# Patient Record
Sex: MALE
[De-identification: names, ages, dates, MRNs, and addresses within clinical notes are randomized per-mention and may not be internally consistent; named-entity substitution may affect disease eponyms.]

---

## 2022-05-17 ENCOUNTER — HOSPITAL ENCOUNTER (EMERGENCY)
Dept: HOSPITAL 5 - ED | Age: 37
Discharge: HOME | End: 2022-05-17
Payer: SELF-PAY

## 2022-05-17 DIAGNOSIS — R20.2: Primary | ICD-10-CM

## 2022-05-17 DIAGNOSIS — M54.2: ICD-10-CM

## 2022-05-17 LAB
APTT BLD: 28.1 SEC. (ref 24.2–36.6)
BASOPHILS # (AUTO): 0.1 K/MM3 (ref 0–0.1)
BASOPHILS NFR BLD AUTO: 1 % (ref 0–1.8)
BUN SERPL-MCNC: 10 MG/DL (ref 9–20)
BUN/CREAT SERPL: 13 %
CALCIUM SERPL-MCNC: 9.3 MG/DL (ref 8.4–10.2)
EOSINOPHIL # BLD AUTO: 0.4 K/MM3 (ref 0–0.4)
EOSINOPHIL NFR BLD AUTO: 4 % (ref 0–4.3)
HCT VFR BLD CALC: 51.8 % (ref 35.5–45.6)
HEMOLYSIS INDEX: 37
HGB BLD-MCNC: 17.4 GM/DL (ref 11.8–15.2)
INR PPP: 0.87 (ref 0.87–1.13)
LYMPHOCYTES # BLD AUTO: 2.5 K/MM3 (ref 1.2–5.4)
LYMPHOCYTES NFR BLD AUTO: 26.7 % (ref 13.4–35)
MCHC RBC AUTO-ENTMCNC: 34 % (ref 32–34)
MCV RBC AUTO: 93 FL (ref 84–94)
MONOCYTES # (AUTO): 0.8 K/MM3 (ref 0–0.8)
MONOCYTES % (AUTO): 8.3 % (ref 0–7.3)
PLATELET # BLD: 187 K/MM3 (ref 140–440)
RBC # BLD AUTO: 5.56 M/MM3 (ref 3.65–5.03)

## 2022-05-17 PROCEDURE — 36415 COLL VENOUS BLD VENIPUNCTURE: CPT

## 2022-05-17 PROCEDURE — 85730 THROMBOPLASTIN TIME PARTIAL: CPT

## 2022-05-17 PROCEDURE — 71046 X-RAY EXAM CHEST 2 VIEWS: CPT

## 2022-05-17 PROCEDURE — 85610 PROTHROMBIN TIME: CPT

## 2022-05-17 PROCEDURE — 80048 BASIC METABOLIC PNL TOTAL CA: CPT

## 2022-05-17 PROCEDURE — 85025 COMPLETE CBC W/AUTO DIFF WBC: CPT

## 2022-05-17 PROCEDURE — 93005 ELECTROCARDIOGRAM TRACING: CPT

## 2022-05-17 PROCEDURE — 99284 EMERGENCY DEPT VISIT MOD MDM: CPT

## 2022-05-17 PROCEDURE — 70450 CT HEAD/BRAIN W/O DYE: CPT

## 2022-05-17 PROCEDURE — 84484 ASSAY OF TROPONIN QUANT: CPT

## 2022-05-17 NOTE — XRAY REPORT
CHEST 2 VIEWS 



INDICATION: 

chest pain.



COMPARISON: 

None.



FINDINGS:

Support devices: None.



Heart: Within normal limits. 

Lungs/Pleura: No acute air space or interstitial disease.   No significant pleural effusion. 



IMPRESSION:  No acute findings.

 



Signer Name: Joe Chen MD 

Signed: 5/17/2022 8:46 AM

Workstation Name: [id number]

## 2022-05-17 NOTE — CAT SCAN REPORT
CT head/brain wo con



INDICATION / CLINICAL INFORMATION:

36 years Male; left sided numbness.



TECHNIQUE: Routine CT head without contrast. All CT scans at this location are performed using CT dos
e reduction for ALARA by means of automated exposure control.



COMPARISON: 

None.



FINDINGS:



BRAIN / INTRACRANIAL CONTENTS: No acute hemorrhage, mass effect, midline shift, hydrocephalus, or acu
te, large territorial infarct. No signs of significant atrophy or chronic infarct. No significant whi
te matter abnormality seen.



CRANIOCERVICAL JUNCTION: No significant abnormality.



ORBITS: No significant abnormality of visualized orbits.

SINUSES / MASTOIDS: Mild to moderate mucosal thickening in the ethmoids.



ADDITIONAL FINDINGS: Prominent soft tissue is seen in the roof of the nasopharynx, presumably related
 to reactive adenoidal tissue. Please clinically correlate. 



IMPRESSION:

1. No focal mass, hemorrhage, hydrocephalus, or acute, large territorial infarct. 



Signer Name: Chris Tsai MD, III 

Signed: 5/17/2022 8:55 AM

Workstation Name: [id number]

## 2022-05-17 NOTE — EMERGENCY DEPARTMENT REPORT
ED General Adult HPI





- General


Chief complaint: Neuro Symptoms/Deficit


Stated complaint: BODY PAIN


Time Seen by Provider: 05/17/22 07:43


Source: patient


Mode of arrival: Ambulatory


Limitations: No Limitations





- History of Present Illness


Initial comments: 





Patient is a 36 years old male with no significant past medical history.  

Patient presented to the ER complaining of 2-1/2 weeks history of on and off 

numbness to the left face left upper and lower extremity.  He denied any 

weakness.  He reported left arm pain also.  Neck pain.  Patient denied any 

recent injury.  He denied also headache, focal weakness, bowel or bladder 

incontinence.


Stroke scale is 0.





- Related Data


                                    Allergies











Allergy/AdvReac Type Severity Reaction Status Date / Time


 


No Known Allergies Allergy   Unverified 05/17/22 01:42














ED Review of Systems


ROS: 


Stated complaint: BODY PAIN


Other details as noted in HPI





Comment: All other systems reviewed and negative


Constitutional: denies: chills, fever


Respiratory: denies: cough, shortness of breath, SOB with exertion


Cardiovascular: denies: chest pain, palpitations


Gastrointestinal: denies: abdominal pain, nausea, vomiting, diarrhea, 

constipation, hematemesis, hematochezia


Musculoskeletal: denies: back pain


Neurological: numbness.  denies: headache, weakness, paresthesias, confusion, 

abnormal gait





ED Physical Exam





- General


Limitations: No Limitations


General appearance: alert, in no apparent distress





- Head


Head exam: Present: atraumatic, normocephalic, normal inspection





- Eye


Eye exam: Present: normal appearance





- ENT


ENT exam: Present: normal exam, normal orophraynx, mucous membranes moist





- Neck


Neck exam: Present: normal inspection, full ROM.  Absent: tenderness, 

meningismus





- Respiratory


Respiratory exam: Present: normal lung sounds bilaterally





- Cardiovascular


Cardiovascular Exam: Present: regular rate, normal rhythm, normal heart sounds





- GI/Abdominal


GI/Abdominal exam: Present: soft, normal bowel sounds.  Absent: distended, 

tenderness, guarding, rebound, rigid, organomegaly, mass, bruit, pulsatile mass,

hernia





- Extremities Exam


Extremities exam: Present: normal inspection, full ROM, normal capillary refill.

 Absent: tenderness, pedal edema, joint swelling, calf tenderness





- Back Exam


Back exam: Present: normal inspection, full ROM.  Absent: CVA tenderness (R), 

CVA tenderness (L)





- Neurological Exam


Neurological exam: Present: alert, oriented X3, CN II-XII intact, normal gait, 

reflexes normal.  Absent: abnormal gait, motor sensory deficit





- Psychiatric


Psychiatric exam: Present: normal mood





- Skin


Skin exam: Present: warm, intact, normal color





ED Medical Decision Making





- Lab Data


Result diagrams: 


                                 05/17/22 09:06





                                 05/17/22 09:06





- EKG Data


-: EKG Interpreted by Me


EKG shows normal: sinus rhythm


Rate: normal





- EKG Data


Interpretation: no acute changes





- Radiology Data


Radiology results: report reviewed





- Medical Decision Making





Patient is a 36 years old male with no significant past medical history.  Regi

ent presented to the ER complaining of 2-1/2 weeks history of on and off 

numbness to the left face left upper and lower extremity.  He denied any 

weakness.  He reported left arm pain also.  Neck pain.  Patient denied any 

recent injury.  He denied also headache, focal weakness, bowel or bladder incon

tinence.





Patient remained stable in the ER with a stable vital sign.  CT brain is 

negative for acute finding.  Labs reviewed and is unremarkable.  EKG is 

unremarkable.  Stroke scale is 0.  Patient advised to follow-up with his primary

care physician in the next 2 to 3 days and to return to the ER if he develop any

new symptoms.





Critical care attestation.: 


If time is entered above; I have spent that time in minutes in the direct care 

of this critically ill patient, excluding procedure time.








ED Disposition


Clinical Impression: 


 Numbness, Acute neck pain





Disposition: 01 HOME / SELF CARE / HOMELESS


Is pt being admited?: No


Condition: Stable


Instructions:  Radicular Pain, Paresthesia


Referrals: 


[facility] [Provider Group] - 3-5 Days


Print Language: Yakut

## 2022-05-17 NOTE — ELECTROCARDIOGRAPH REPORT
[facility]

                                       

Test Date:    2022               Test Time:    01:48:29

Pat Name:     GABRIEL BAUTISTA          Department:   

Patient ID:   [id number]          Room:          

Gender:       M                        Technician:   LULU

:          1985               Requested By: DONALD HURST

Order Number: [id number]              Reading MD:   Syd Martinez

                                 Measurements

Intervals                              Axis          

Rate:         78                       P:            27

FL:           150                      QRS:          39

QRSD:         99                       T:            57

QT:           369                                    

QTc:          421                                    

                           Interpretive Statements

Sinus rhythm

ST elev, probable normal early repol pattern

No previous ECG available for comparison

Electronically Signed On 2022 12:37:47 EDT by Syd Martinez

## 2022-05-17 NOTE — ELECTROCARDIOGRAPH REPORT
[facility]

                                       

Test Date:    2022               Test Time:    08:46:34

Pat Name:     GABRIEL BAUTISTA          Department:   

Patient ID:   [id number]          Room:          

Gender:       M                        Technician:   GAVIN

:          1985               Requested By: REYNALDO HOWARD

Order Number: [id number]              Reading MD:   Syd Martinez

                                 Measurements

Intervals                              Axis          

Rate:         78                       P:            27

ME:           157                      QRS:          24

QRSD:         101                      T:            4

QT:           368                                    

QTc:          420                                    

                           Interpretive Statements

Sinus rhythm

Compared to ECG 2022 01:48:29

No significant change noted.

Electronically Signed On 2022 12:44:40 EDT by Syd Martinez